# Patient Record
Sex: FEMALE | Race: WHITE | Employment: FULL TIME | URBAN - METROPOLITAN AREA
[De-identification: names, ages, dates, MRNs, and addresses within clinical notes are randomized per-mention and may not be internally consistent; named-entity substitution may affect disease eponyms.]

---

## 2020-04-13 ENCOUNTER — INITIAL PRENATAL (OUTPATIENT)
Dept: OBGYN CLINIC | Age: 30
End: 2020-04-13
Payer: COMMERCIAL

## 2020-04-13 VITALS
SYSTOLIC BLOOD PRESSURE: 103 MMHG | BODY MASS INDEX: 33.75 KG/M2 | TEMPERATURE: 98.6 F | WEIGHT: 183.38 LBS | DIASTOLIC BLOOD PRESSURE: 74 MMHG | HEIGHT: 62 IN | HEART RATE: 96 BPM

## 2020-04-13 PROBLEM — Z3A.24 24 WEEKS GESTATION OF PREGNANCY: Status: ACTIVE | Noted: 2020-04-13

## 2020-04-13 PROCEDURE — 76811 OB US DETAILED SNGL FETUS: CPT | Performed by: OBSTETRICS & GYNECOLOGY

## 2020-04-13 PROCEDURE — 99203 OFFICE O/P NEW LOW 30 MIN: CPT | Performed by: OBSTETRICS & GYNECOLOGY

## 2020-04-13 NOTE — PROGRESS NOTES
DOS: 20     [facility]ERNAL FETAL [facility]ICINE       MATERNAL-FETAL MEDICINE CONSULT    Referring/Requesting Provider: Thanh Arenas MD       CHIEF COMPLAINT: history of first trimester alcohol exposure    HISTORY OF PRESENT ILLNESS:   Isabelle Vazquez is a 26 y.o. female  at 25w3d who presents for ultrasound and consultation regarding her history of first trimester alcohol exposure and personal history of hip dysplasia. She reports drinking alcohol on 3 occasions prior to finding out about pregnancy at 5 weeks. She denies complaints and is overall feeling well. OB HISTORY:  OB History    Para Term  AB Living   2 1 1         SAB TAB Ectopic Molar Multiple Live Births                    # Outcome Date GA Lbr Jan/2nd Weight Sex Delivery Anes PTL Lv   2 Current            1 Term 2010    F Vag-Spont          PAST MEDICAL HISTORY:  Past Medical History:   Diagnosis Date    Abnormal Pap smear of cervix            PAST SURGICAL HISTORY:  History reviewed. No pertinent surgical history. PERTINENT FAMILY HISTORY:  No family history on file. MEDS:   Current Outpatient Rx   Medication Sig Dispense Refill    Prenatal Vit-Fe Fumarate-FA (PRENATAL VITAMIN PO) Take 1 tablet by mouth daily         ALLERGY:   No Known Allergies    REVIEW OF SYSTEMS:     Review of Systems   All other systems reviewed and are negative. PHYSICAL EXAM:  VITAL SIGNS: /74   Pulse 96   Temp 98.6 °F (37 °C) (Oral)   Ht 5' 2\" (1.575 m)   Wt 183 lb 6 oz (83.2 kg)   BMI 33.54 kg/m²     Physical Exam  Constitutional:       Appearance: Normal appearance. Neurological:      Mental Status: She is alert. Vitals signs and nursing note reviewed. IMAGING:  Please see ultrasound report for full details. IMPRESSION:   Isabelle Vazquez is a 26 y.o. female  with history of first trimester alcohol use and personal history of hip dysplasia.      1.  Alcohol:  The patient was counseled that there is no

## 2020-04-13 NOTE — LETTER
[facility] MATERNAL FETAL MEDICINE  [address] [address]   Dept: [phone]  Loc: [phone]  Jarod Chan MD                                           [facility] Consultation Letter     20     Zay Hebert MD     Re: Patient: Km Ford  YOB: 1990    MR Number: [id number] Date of Visit: 2020     Dear Dr. Viola Pruett had the pleasure of seeing your patient, Km Ford, in consultation in the [facility] Fetal Medicine Department of [facility]). DOS: 20     [facility] MATERNAL FETAL MEDICINE       MATERNAL-FETAL MEDICINE CONSULT    Referring/Requesting Provider: Zay Hebert MD       CHIEF COMPLAINT:  history of first trimester alcohol exposure    HISTORY OF PRESENT ILLNESS:   Lisbet Jones is a 26 y.o. female  at 25w3d who presents for ultrasound and consultation regarding her history of first trimester alcohol exposure and personal history of hip dysplasia. She reports drinking alcohol on 3 occasions prior to finding out about pregnancy at 5 weeks. She denies complaints and is overall feeling well. OB HISTORY:  OB History    Para Term  AB Living   2 1 1         SAB TAB Ectopic Molar Multiple Live Births                    # Outcome Date GA Lbr Jan/2nd Weight Sex Delivery Anes PTL Lv   2 Current            1 Term 2010    F Vag-Spont          PAST MEDICAL HISTORY:  Past Medical History:   Diagnosis Date    Abnormal Pap smear of cervix            PAST SURGICAL HISTORY:  History reviewed. No pertinent surgical history. PERTINENT FAMILY HISTORY:  No family history on file. MEDS:   Current Outpatient Rx   Medication Sig Dispense Refill    Prenatal Vit-Fe Fumarate-FA (PRENATAL VITAMIN PO) Take 1 tablet by mouth daily         ALLERGY:   No Known Allergies    REVIEW OF SYSTEMS:     Review of Systems   All other systems reviewed and are negative.        PHYSICAL EXAM:

## 2020-07-29 ENCOUNTER — HOSPITAL ENCOUNTER (INPATIENT)
Age: 30
LOS: 3 days | Discharge: HOME | DRG: 560 | End: 2020-08-01
Attending: LEGAL MEDICINE | Admitting: LEGAL MEDICINE
Payer: COMMERCIAL

## 2020-07-29 LAB
AMNISURE, POC: POSITIVE
Lab: ABNORMAL
NEGATIVE QC PASS/FAIL: ABNORMAL
POSITIVE QC PASS/FAIL: ABNORMAL

## 2020-07-29 PROCEDURE — 1220000001 HC SEMI PRIVATE L&D R&B

## 2020-07-29 RX ORDER — SODIUM CHLORIDE 0.9 % (FLUSH) 0.9 %
10 SYRINGE (ML) INJECTION PRN
Status: DISCONTINUED | OUTPATIENT
Start: 2020-07-29 | End: 2020-07-30

## 2020-07-29 RX ORDER — SODIUM CHLORIDE 0.9 % (FLUSH) 0.9 %
10 SYRINGE (ML) INJECTION EVERY 12 HOURS SCHEDULED
Status: DISCONTINUED | OUTPATIENT
Start: 2020-07-30 | End: 2020-07-30

## 2020-07-30 ENCOUNTER — ANESTHESIA (OUTPATIENT)
Dept: LABOR AND DELIVERY | Age: 30
DRG: 560 | End: 2020-07-30
Payer: COMMERCIAL

## 2020-07-30 ENCOUNTER — ANESTHESIA EVENT (OUTPATIENT)
Dept: LABOR AND DELIVERY | Age: 30
DRG: 560 | End: 2020-07-30
Payer: COMMERCIAL

## 2020-07-30 PROBLEM — Z37.9 NORMAL LABOR: Status: ACTIVE | Noted: 2020-07-30

## 2020-07-30 LAB
ABO/RH: NORMAL
ALBUMIN SERPL-MCNC: 3.2 G/DL (ref 3.5–5.2)
ALP BLD-CCNC: 174 U/L (ref 35–104)
ALT SERPL-CCNC: 14 U/L (ref 0–32)
AMPHETAMINE SCREEN, URINE: NOT DETECTED
ANION GAP SERPL CALCULATED.3IONS-SCNC: 17 MMOL/L (ref 7–16)
ANTIBODY SCREEN: NORMAL
AST SERPL-CCNC: 34 U/L (ref 0–31)
BARBITURATE SCREEN URINE: NOT DETECTED
BENZODIAZEPINE SCREEN, URINE: NOT DETECTED
BILIRUB SERPL-MCNC: 0.3 MG/DL (ref 0–1.2)
BUN BLDV-MCNC: 8 MG/DL (ref 6–20)
BUPRENORPHINE URINE: NOT DETECTED
CALCIUM SERPL-MCNC: 10.1 MG/DL (ref 8.6–10.2)
CANNABINOID SCREEN URINE: NOT DETECTED
CHLORIDE BLD-SCNC: 99 MMOL/L (ref 98–107)
CO2: 19 MMOL/L (ref 22–29)
COCAINE METABOLITE SCREEN URINE: NOT DETECTED
CREAT SERPL-MCNC: 0.6 MG/DL (ref 0.5–1)
GFR AFRICAN AMERICAN: >60
GFR NON-AFRICAN AMERICAN: >60 ML/MIN/1.73
GLUCOSE BLD-MCNC: 77 MG/DL (ref 74–99)
HCT VFR BLD CALC: 33.5 % (ref 34–48)
HEMOGLOBIN: 10.6 G/DL (ref 11.5–15.5)
Lab: NORMAL
MCH RBC QN AUTO: 28.2 PG (ref 26–35)
MCHC RBC AUTO-ENTMCNC: 31.6 % (ref 32–34.5)
MCV RBC AUTO: 89.1 FL (ref 80–99.9)
METHADONE SCREEN, URINE: NOT DETECTED
OPIATE SCREEN URINE: NOT DETECTED
OXYCODONE URINE: NOT DETECTED
PDW BLD-RTO: 15.6 FL (ref 11.5–15)
PHENCYCLIDINE SCREEN URINE: NOT DETECTED
PLATELET # BLD: 247 E9/L (ref 130–450)
PMV BLD AUTO: 10.7 FL (ref 7–12)
POTASSIUM SERPL-SCNC: 4.7 MMOL/L (ref 3.5–5)
RBC # BLD: 3.76 E12/L (ref 3.5–5.5)
SODIUM BLD-SCNC: 135 MMOL/L (ref 132–146)
TOTAL PROTEIN: 7 G/DL (ref 6.4–8.3)
WBC # BLD: 12.4 E9/L (ref 4.5–11.5)

## 2020-07-30 PROCEDURE — 86850 RBC ANTIBODY SCREEN: CPT

## 2020-07-30 PROCEDURE — 84112 EVAL AMNIOTIC FLUID PROTEIN: CPT

## 2020-07-30 PROCEDURE — 80307 DRUG TEST PRSMV CHEM ANLYZR: CPT

## 2020-07-30 PROCEDURE — 88307 TISSUE EXAM BY PATHOLOGIST: CPT

## 2020-07-30 PROCEDURE — 2580000003 HC RX 258: Performed by: LEGAL MEDICINE

## 2020-07-30 PROCEDURE — 85027 COMPLETE CBC AUTOMATED: CPT

## 2020-07-30 PROCEDURE — 1220000001 HC SEMI PRIVATE L&D R&B

## 2020-07-30 PROCEDURE — 6360000002 HC RX W HCPCS: Performed by: LEGAL MEDICINE

## 2020-07-30 PROCEDURE — 86901 BLOOD TYPING SEROLOGIC RH(D): CPT

## 2020-07-30 PROCEDURE — 6370000000 HC RX 637 (ALT 250 FOR IP): Performed by: LEGAL MEDICINE

## 2020-07-30 PROCEDURE — 7200000001 HC VAGINAL DELIVERY

## 2020-07-30 PROCEDURE — 36415 COLL VENOUS BLD VENIPUNCTURE: CPT

## 2020-07-30 PROCEDURE — 86900 BLOOD TYPING SEROLOGIC ABO: CPT

## 2020-07-30 PROCEDURE — 80053 COMPREHEN METABOLIC PANEL: CPT

## 2020-07-30 RX ORDER — SODIUM CHLORIDE 0.9 % (FLUSH) 0.9 %
10 SYRINGE (ML) INJECTION EVERY 12 HOURS SCHEDULED
Status: DISCONTINUED | OUTPATIENT
Start: 2020-07-30 | End: 2020-08-01 | Stop reason: HOSPADM

## 2020-07-30 RX ORDER — LIDOCAINE HYDROCHLORIDE 10 MG/ML
30 INJECTION, SOLUTION EPIDURAL; INFILTRATION; INTRACAUDAL; PERINEURAL PRN
Status: DISCONTINUED | OUTPATIENT
Start: 2020-07-30 | End: 2020-07-30

## 2020-07-30 RX ORDER — MODIFIED LANOLIN
OINTMENT (GRAM) TOPICAL PRN
Status: DISCONTINUED | OUTPATIENT
Start: 2020-07-30 | End: 2020-08-01 | Stop reason: HOSPADM

## 2020-07-30 RX ORDER — SODIUM CHLORIDE 0.9 % (FLUSH) 0.9 %
10 SYRINGE (ML) INJECTION PRN
Status: DISCONTINUED | OUTPATIENT
Start: 2020-07-30 | End: 2020-08-01 | Stop reason: HOSPADM

## 2020-07-30 RX ORDER — METHYLERGONOVINE MALEATE 0.2 MG/ML
200 INJECTION INTRAVENOUS
Status: ACTIVE | OUTPATIENT
Start: 2020-07-30 | End: 2020-07-30

## 2020-07-30 RX ORDER — IBUPROFEN 600 MG/1
600 TABLET ORAL EVERY 6 HOURS
Status: DISCONTINUED | OUTPATIENT
Start: 2020-07-30 | End: 2020-08-01 | Stop reason: HOSPADM

## 2020-07-30 RX ORDER — SODIUM CHLORIDE, SODIUM LACTATE, POTASSIUM CHLORIDE, CALCIUM CHLORIDE 600; 310; 30; 20 MG/100ML; MG/100ML; MG/100ML; MG/100ML
INJECTION, SOLUTION INTRAVENOUS CONTINUOUS
Status: DISCONTINUED | OUTPATIENT
Start: 2020-07-30 | End: 2020-08-01 | Stop reason: HOSPADM

## 2020-07-30 RX ORDER — EPHEDRINE SULFATE/0.9% NACL/PF 50 MG/5 ML
10 SYRINGE (ML) INTRAVENOUS EVERY 5 MIN PRN
Status: DISCONTINUED | OUTPATIENT
Start: 2020-07-30 | End: 2020-07-30

## 2020-07-30 RX ORDER — SODIUM CHLORIDE, SODIUM LACTATE, POTASSIUM CHLORIDE, CALCIUM CHLORIDE 600; 310; 30; 20 MG/100ML; MG/100ML; MG/100ML; MG/100ML
500 INJECTION, SOLUTION INTRAVENOUS
Status: DISCONTINUED | OUTPATIENT
Start: 2020-07-30 | End: 2020-07-30

## 2020-07-30 RX ORDER — MORPHINE SULFATE 2 MG/ML
2 INJECTION, SOLUTION INTRAMUSCULAR; INTRAVENOUS EVERY 4 HOURS PRN
Status: DISCONTINUED | OUTPATIENT
Start: 2020-07-30 | End: 2020-07-30

## 2020-07-30 RX ORDER — DOCUSATE SODIUM 100 MG/1
100 CAPSULE, LIQUID FILLED ORAL 2 TIMES DAILY
Status: DISCONTINUED | OUTPATIENT
Start: 2020-07-30 | End: 2020-08-01 | Stop reason: HOSPADM

## 2020-07-30 RX ORDER — EPHEDRINE SULFATE/0.9% NACL/PF 50 MG/5 ML
10 SYRINGE (ML) INTRAVENOUS EVERY 5 MIN PRN
Status: DISCONTINUED | OUTPATIENT
Start: 2020-07-30 | End: 2020-07-30 | Stop reason: CLARIF

## 2020-07-30 RX ORDER — OXYCODONE HYDROCHLORIDE 5 MG/1
5 TABLET ORAL EVERY 4 HOURS PRN
Status: DISCONTINUED | OUTPATIENT
Start: 2020-07-30 | End: 2020-08-01 | Stop reason: HOSPADM

## 2020-07-30 RX ORDER — ACETAMINOPHEN 500 MG
1000 TABLET ORAL EVERY 6 HOURS PRN
Status: DISCONTINUED | OUTPATIENT
Start: 2020-07-30 | End: 2020-08-01 | Stop reason: HOSPADM

## 2020-07-30 RX ORDER — CARBOPROST TROMETHAMINE 250 UG/ML
250 INJECTION, SOLUTION INTRAMUSCULAR
Status: ACTIVE | OUTPATIENT
Start: 2020-07-30 | End: 2020-07-30

## 2020-07-30 RX ORDER — FERROUS SULFATE 325(65) MG
325 TABLET ORAL
Status: DISCONTINUED | OUTPATIENT
Start: 2020-07-30 | End: 2020-08-01 | Stop reason: HOSPADM

## 2020-07-30 RX ORDER — DEXTROSE, SODIUM CHLORIDE, SODIUM LACTATE, POTASSIUM CHLORIDE, AND CALCIUM CHLORIDE 5; .6; .31; .03; .02 G/100ML; G/100ML; G/100ML; G/100ML; G/100ML
INJECTION, SOLUTION INTRAVENOUS CONTINUOUS
Status: DISCONTINUED | OUTPATIENT
Start: 2020-07-30 | End: 2020-07-30

## 2020-07-30 RX ORDER — SODIUM CHLORIDE, SODIUM LACTATE, POTASSIUM CHLORIDE, CALCIUM CHLORIDE 600; 310; 30; 20 MG/100ML; MG/100ML; MG/100ML; MG/100ML
INJECTION, SOLUTION INTRAVENOUS CONTINUOUS
Status: DISCONTINUED | OUTPATIENT
Start: 2020-07-30 | End: 2020-07-30

## 2020-07-30 RX ORDER — ONDANSETRON 2 MG/ML
4 INJECTION INTRAMUSCULAR; INTRAVENOUS EVERY 6 HOURS PRN
Status: DISCONTINUED | OUTPATIENT
Start: 2020-07-30 | End: 2020-07-30

## 2020-07-30 RX ORDER — SODIUM CHLORIDE, SODIUM LACTATE, POTASSIUM CHLORIDE, CALCIUM CHLORIDE 600; 310; 30; 20 MG/100ML; MG/100ML; MG/100ML; MG/100ML
500 INJECTION, SOLUTION INTRAVENOUS CONTINUOUS
Status: DISCONTINUED | OUTPATIENT
Start: 2020-07-30 | End: 2020-07-30

## 2020-07-30 RX ADMIN — DOCUSATE SODIUM 100 MG: 100 CAPSULE, LIQUID FILLED ORAL at 19:16

## 2020-07-30 RX ADMIN — WITCH HAZEL 40 EACH: 500 SOLUTION RECTAL; TOPICAL at 16:12

## 2020-07-30 RX ADMIN — MORPHINE SULFATE 2 MG: 2 INJECTION, SOLUTION INTRAMUSCULAR; INTRAVENOUS at 11:35

## 2020-07-30 RX ADMIN — SODIUM CHLORIDE, POTASSIUM CHLORIDE, SODIUM LACTATE AND CALCIUM CHLORIDE: 600; 310; 30; 20 INJECTION, SOLUTION INTRAVENOUS at 00:26

## 2020-07-30 RX ADMIN — BENZOCAINE AND LEVOMENTHOL: 200; 5 SPRAY TOPICAL at 16:12

## 2020-07-30 RX ADMIN — Medication 1 MILLI-UNITS/MIN: at 02:39

## 2020-07-30 RX ADMIN — SODIUM CHLORIDE, PRESERVATIVE FREE 10 ML: 5 INJECTION INTRAVENOUS at 15:10

## 2020-07-30 RX ADMIN — IBUPROFEN 600 MG: 600 TABLET, FILM COATED ORAL at 19:16

## 2020-07-30 RX ADMIN — Medication: at 16:12

## 2020-07-30 ASSESSMENT — PAIN SCALES - GENERAL
PAINLEVEL_OUTOF10: 10
PAINLEVEL_OUTOF10: 3
PAINLEVEL_OUTOF10: 0

## 2020-07-30 ASSESSMENT — PAIN DESCRIPTION - DESCRIPTORS
DESCRIPTORS: DISCOMFORT;SHARP;SQUEEZING
DESCRIPTORS: PRESSURE;SQUEEZING;TIGHTNESS

## 2020-07-30 NOTE — PROGRESS NOTES
Dr. Dangelo Naik called in for update on patient. Reported EFM reading. Orders received to start Pitocin at 1 then increase by 1 every hour.

## 2020-07-30 NOTE — PROGRESS NOTES
Patient arrived ambulatory on OB unit with c/o LOF. Patient is due 20 and is 39 weeks. She is a  2 para 1. Patient taken to labor room 11. Room orientation provided. Patient denies any vaginal bleeding at this time and reports positive fetal movement.

## 2020-07-30 NOTE — H&P
[address] [place]                              HISTORY AND PHYSICAL    PATIENT NAME: Dee Jones                     :        1990  MED REC NO:   [id number]                            ROOM:       LD11  ACCOUNT NO:   [de-identified]                           ADMIT DATE: 2020. PROVIDER:     Adi Meza MD    HISTORY OF PRESENT ILLNESS:  The patient is a 67-year-old white female   2, para 1, presented with spontaneous rupture of membranes on  2020. No vaginal bleeding. Irregular contractions. Group B  strep negative. No change in her bowel or urinary habits. No fevers or  chills. No viral prodrome. For her past medical, surgical, GYN, social, family history, please  refer to ACOG forms A and B. REVIEW OF SYSTEMS:  Negative for cardiac, respiratory, GI, ,  neurologic, psychiatric, ENT, integument, hematologic, lymphatic,  constitutional abnormalities. PHYSICAL EXAMINATION:  VITAL SIGNS:  Stable. Blood pressure is 112/75, heart rate 81,  temperature 97.9, respiratory rate 18. ABDOMEN:  Gravid, soft, nontender. Fetal heart tones are present in the  140s with nsdz-ea-ebvo variability present. Accelerations noted. Irregular contractions. Cervix is 3/50, vertex -2 with clear fluid  issuing. Estimated fetal weight is 3000 gm. EXTREMITIES:  No clubbing, cyanosis. 2+ edema. NEURO:  CN II through XII are grossly intact. She is alert and oriented  x4. ASSESSMENT:  Intrauterine pregnancy at 39-1/2 weeks in labor. PLAN:  Risks of vaginal delivery and operative delivery have been  reviewed with her. Indications for operative delivery have been  reviewed with her. Shoulder dystocia and birth trauma have been  reviewed with her. All her questions have been answered and she wishes  to proceed with attempt at vaginal delivery.         Bello Salmon MD    D: 2020 8:03:57 T: 07/30/2020 8:11:45     [email or web address]  Job#: [id number]     Doc#: [id number]

## 2020-07-30 NOTE — PROGRESS NOTES
Very restless. States pain not much better after IV Morphine dispensed. Requesting epidural for relief of labor pain now.  IV bolus initiated

## 2020-07-30 NOTE — PROGRESS NOTES
Strong urge to push with contractions now with much needed support provided. Instructed on slow deep breathing when possible with attempt made by patient but states pressure strong now. Hema Hernandez notified in office to attend delivery and states she is on her way.

## 2020-07-30 NOTE — PROGRESS NOTES
Bedside commode obtained at this time from central supply. Explained to patient to use while in labor so that she does not have to be unhooked from the monitor.

## 2020-07-30 NOTE — PROGRESS NOTES
Becoming tearful with contractions. Breathing well through contractions with support given to patient. No request for analgesia at this time. Instructed on slow,deep breathing with contractions.

## 2020-07-30 NOTE — PLAN OF CARE
Problem: Anxiety:  Goal: Level of anxiety will decrease  Description: Level of anxiety will decrease  2020 by Milton Whitaker RN  Outcome: Completed     Problem: Breathing Pattern - Ineffective:  Goal: Able to breathe comfortably  Description: Able to breathe comfortably  2020 by Milton Whitaker RN  Outcome: Completed     Problem: Fluid Volume - Imbalance:  Goal: Absence of imbalanced fluid volume signs and symptoms  Description: Absence of imbalanced fluid volume signs and symptoms  2020 by Milton Whitaker RN  Outcome: Completed     Problem: Fluid Volume - Imbalance:  Goal: Absence of intrapartum hemorrhage signs and symptoms  Description: Absence of intrapartum hemorrhage signs and symptoms  Outcome: Completed     Problem: Infection - Intrapartum Infection:  Goal: Will show no infection signs and symptoms  Description: Will show no infection signs and symptoms  Outcome: Completed     Problem: Labor Process - Prolonged:  Goal: Labor progression, first stage, within specified pattern  Description: Labor progression, first stage, within specified pattern  Outcome: Completed     Problem: Labor Process - Prolonged:  Goal: Labor progession, second stage, within specified pattern  Description: Labor progession, second stage, within specified pattern  Outcome: Completed     Problem: Labor Process - Prolonged:  Goal: Uterine contractions within specified parameters  Description: Uterine contractions within specified parameters  Outcome: Completed     Problem: Labor Process - Prolonged:  Goal: Uterine contractions within specified parameters  Description: Uterine contractions within specified parameters  Outcome: Completed     Problem:  Screening:  Goal: Ability to make informed decisions regarding treatment has improved  Description: Ability to make informed decisions regarding treatment has improved  Outcome: Completed     Problem: Pain - Acute:  Goal: Pain level will decrease  Description: Pain level will decrease  Outcome: Completed     Problem: Pain - Acute:  Goal: Able to cope with pain  Description: Able to cope with pain  Outcome: Completed     Problem: Tissue Perfusion - Uteroplacental, Altered:  Goal: Absence of abnormal fetal heart rate pattern  Description: Absence of abnormal fetal heart rate pattern  Outcome: Completed     Problem: Urinary Retention:  Goal: Experiences of bladder distention will decrease  Description: Experiences of bladder distention will decrease  Outcome: Completed     Problem: Urinary Retention:  Goal: Urinary elimination within specified parameters  Description: Urinary elimination within specified parameters  Outcome: Completed     Problem: Pain:  Goal: Pain level will decrease  Description: Pain level will decrease  Outcome: Completed     Problem: Pain:  Goal: Control of acute pain  Description: Control of acute pain  Outcome: Completed     Problem: Pain:  Goal: Control of chronic pain  Description: Control of chronic pain  Outcome: Completed

## 2020-07-30 NOTE — ANESTHESIA PRE PROCEDURE
Department of Anesthesiology  Preprocedure Note       Name:  Deanna Nix   Age:  26 y.o.  :  1990                                          MRN:  [id number]         Date:  2020      Surgeon: * No surgeons listed *    Procedure: * No procedures listed *    Medications prior to admission:   Prior to Admission medications    Medication Sig Start Date End Date Taking?  Authorizing Provider   Prenatal Vit-Fe Fumarate-FA (PRENATAL VITAMIN PO) Take 1 tablet by mouth daily   Yes Historical Provider, MD       Current medications:    Current Facility-Administered Medications   Medication Dose Route Frequency Provider Last Rate Last Dose    dextrose 5 % in lactated ringers infusion   Intravenous Continuous Milly Blanco MD        lactated ringers infusion   Intravenous Continuous Milly Blanco  mL/hr at 20 1145      lidocaine PF 1 % injection 30 mL  30 mL Other PRN Milly Blanco MD        oxytocin (PITOCIN) 30 units in 500 mL infusion  1 katya-units/min Intravenous Continuous Milly Blanco MD 10 mL/hr at 20 1139 10 katya-units/min at 20 1139    ondansetron (ZOFRAN) injection 4 mg  4 mg Intravenous Q6H PRN Milly Blanco MD        oxytocin (PITOCIN) 30 units in 500 mL infusion  1 katya-units/min Intravenous Continuous PRN Milly Blanco MD        morphine (PF) injection 2 mg  2 mg Intravenous Q4H PRN Milly Blanco MD   2 mg at 20 1135    fentaNYL 1.85mcg/ml and Bupivicaine 0.1% in 0.9% NS 135ml infusion (OB) epidural  15 mL/hr Epidural Continuous Krissy Mccallum MD        fentaNYL 1.85mcg/ml and bupivacaine 0.1% 15ml syringe (Home Care) (OB) 15 mL epidural  15 mL Epidural Once Krissy Mccallum MD        lactated ringers infusion 500 mL  500 mL Intravenous Once PRN Krissy Mccallum MD        lactated ringers infusion 500 mL  500 mL Intravenous Once PRN Krissy Mccallum MD        ePHEDrine injection 10 mg  10 mg Intravenous Q5 Min PRN Harley Thorpe Sirisha Santana MD        lactated ringers infusion 500 mL  500 mL Intravenous Continuous Florinda Varma MD        sodium chloride flush 0.9 % injection 10 mL  10 mL Intravenous 2 times per day Rae Oliver MD        sodium chloride flush 0.9 % injection 10 mL  10 mL Intravenous PRN Rae Oliver MD        hydrocortisone 2.5 % cream   Topical Q2H PRN Rae Oliver MD           Allergies:  No Known Allergies    Problem List:    Patient Active Problem List   Diagnosis Code    24 weeks gestation of pregnancy Z3A.24    Normal labor and delivery O80       Past Medical History:        Diagnosis Date    Abnormal Pap smear of cervix     2017       Past Surgical History:  No past surgical history on file. Social History:    Social History     Tobacco Use    Smoking status: Never Smoker   Substance Use Topics    Alcohol use: Not Currently     Comment: drank alcohol during first 5 weeks prior to knowing                                Counseling given: Not Answered      Vital Signs (Current):   Vitals:    07/30/20 1000 07/30/20 1031 07/30/20 1104 07/30/20 1131   BP: 118/72 111/67  117/76   Pulse: 90 71  68   Resp:  20 18    Temp:       TempSrc:       Weight:       Height:                                                  BP Readings from Last 3 Encounters:   07/30/20 117/76   04/13/20 103/74       NPO Status:                                                                                 BMI:   Wt Readings from Last 3 Encounters:   07/30/20 203 lb (92.1 kg)   04/13/20 183 lb 6 oz (83.2 kg)     Body mass index is 38.36 kg/m².     CBC:   Lab Results   Component Value Date    WBC 12.4 07/30/2020    RBC 3.76 07/30/2020    HGB 10.6 07/30/2020    HCT 33.5 07/30/2020    MCV 89.1 07/30/2020    RDW 15.6 07/30/2020     07/30/2020       CMP:   Lab Results   Component Value Date     07/30/2020    K 4.7 07/30/2020    CL 99 07/30/2020    CO2 19 07/30/2020    BUN 8 07/30/2020    CREATININE 0.6 07/30/2020    GFRAA >60 07/30/2020    LABGLOM >60 07/30/2020    GLUCOSE 77 07/30/2020    PROT 7.0 07/30/2020    CALCIUM 10.1 07/30/2020    BILITOT 0.3 07/30/2020    ALKPHOS 174 07/30/2020    AST 34 07/30/2020    ALT 14 07/30/2020       POC Tests: No results for input(s): POCGLU, POCNA, POCK, POCCL, POCBUN, POCHEMO, POCHCT in the last 72 hours. Coags: No results found for: PROTIME, INR, APTT    HCG (If Applicable): No results found for: PREGTESTUR, PREGSERUM, HCG, HCGQUANT     ABGs: No results found for: PHART, PO2ART, BKP4RRG, KUE4LZI, BEART, C9CCQGNH     Type & Screen (If Applicable):  No results found for: LABABO, LABRH    Drug/Infectious Status (If Applicable):  No results found for: HIV, HEPCAB    COVID-19 Screening (If Applicable): No results found for: COVID19      Anesthesia Evaluation    Airway: Mallampati: II  TM distance: >3 FB   Neck ROM: full  Mouth opening: > = 3 FB Dental:          Pulmonary:Negative Pulmonary ROS and normal exam                               Cardiovascular:Negative CV ROS            Rhythm: regular  Rate: normal                    Neuro/Psych:   Negative Neuro/Psych ROS              GI/Hepatic/Renal: Neg GI/Hepatic/Renal ROS            Endo/Other: Negative Endo/Other ROS                    Abdominal:           Vascular: negative vascular ROS. Anesthesia Plan      epidural     ASA 2             Anesthetic plan and risks discussed with patient.         Attending anesthesiologist reviewed and agrees with Pre Eval content              BERNADETTE Saez CRNA   7/30/2020

## 2020-07-30 NOTE — PROGRESS NOTES
Patient placed on EFM. Audible heart tones heard by RN. Amnisure and cervical exam completed. Amnisure positive for ROM. Vital signs obtained, assessment completed. Patient denies any pain at this time. Consents explained and signed by patient. Patient verbalizes understanding and has no questions.

## 2020-07-30 NOTE — PROGRESS NOTES
Spontaneous vaginal delivery for  viable female by Sherley Smyth at 1235pm. Baby alert and active at time of delivery with strong cry observed. Baby taken to warmer for evaluation by Nursery RN.  Apgars 9/9 Weight [id number]

## 2020-07-30 NOTE — PROGRESS NOTES
Baby skin to skin with mother at mothers request. Baby alert and active. Mother instructed on importance of baby's positioning and to maintain  non-obstructed airway in baby at all times when skin to skin- mother verbalized understanding.

## 2020-07-30 NOTE — OP NOTE
[address]                                OPERATIVE REPORT    PATIENT NAME: Harjinder Jones                     :        1990  MED REC NO:   [id number]                            ROOM:       LD11  ACCOUNT NO:   [de-identified]                           ADMIT DATE: 2020. PROVIDER:     Jaiden Gonzales MD    DATE OF PROCEDURE:  2020    SURGEON:  Jaiden Gonzales MD    PROCEDURE IN DETAIL:  The patient felt the urge to push. She was placed  in [place] position. She was able to deliver the fetal head. Fetal  trunk was delivered with minimal traction applied in an axis parallel to  the fetal spine after the shoulder had cleared the pubic bone. Nares  and hypopharynx were suctioned. Cord was clamped and cut. Infant was  crying and vigorous. Cord gases and blood samples were obtained. Placenta was removed spontaneously. Note was made of an intact two  artery, one vein cord placenta which was sent to pathology. No cervical  or vaginal lacerations were noted. Fundus was firm. Estimated blood  loss was 400 mL. Bedside exam of the infant revealed no gross  abnormalities. The cord arterial gas pH is 7.350, base excess -2.1. Cord venous gas pH 7.371, base excess -0.9. Pediatrician is Saurabh Norton. Mother and infant went to recovery room in stable condition.         Alec Cardoso MD    D: 2020 13:07:26       T: 2020 13:16:19     [email or web address]  Job#: [id number]     Doc#: [id number]    CC:

## 2020-07-30 NOTE — PROGRESS NOTES
Updated Dr. Alexandrea Galloway on pt arrival, and ROM. Reported cervical exam, assessment, and EFM readings. Orders received to admit patient.

## 2020-07-30 NOTE — PROGRESS NOTES
RN assuming care of patient. IV Pitocin augmentation of labor in progress with RN assessing FHR and contractions [id number] while Pitocin infusing in first stage of labor. VSS. Afebrile. Reactive FHR tracing observed with audible fetal activity noted. Contractions mild /irregular and patient breathing well through contractions unassisted. Denies urge to void. IVF's LR infusing well with Pitocin. Support person attentive at bedside,. Ice chips given. Call bell in reach.

## 2020-07-30 NOTE — PROGRESS NOTES
Ez Murphy in at bedside to see patient and discuss plan of care-patient agreeable. Vaginal exam done at this time. Scant bloody show observed with exam. Jose Cruz care given with chux changed and clean jose cruz pad given. Patient and Ez Murphy  discussed options for pain control during labor and patient refusing epidural at this time. Orders for IV analgesia received.

## 2020-07-30 NOTE — PROGRESS NOTES
IV Morphine given for c/o painful contractions at patients request. Becoming more uncomfortable and restless. Support given.

## 2020-07-30 NOTE — PROGRESS NOTES
Skin to skin ended. Baby wrapped and held by family member. Lunch ordered by patient. Doing well with no c/o's at this time. VSS.

## 2020-07-30 NOTE — PLAN OF CARE
Problem: Anxiety:  Goal: Level of anxiety will decrease  Description: Level of anxiety will decrease  Outcome: Met This Shift     Problem: Breathing Pattern - Ineffective:  Goal: Able to breathe comfortably  Description: Able to breathe comfortably  Outcome: Met This Shift     Problem: Fluid Volume - Imbalance:  Goal: Absence of imbalanced fluid volume signs and symptoms  Description: Absence of imbalanced fluid volume signs and symptoms  Outcome: Met This Shift

## 2020-07-31 PROBLEM — Z37.9 NORMAL LABOR: Status: ACTIVE | Noted: 2020-07-31

## 2020-07-31 LAB — HEMOGLOBIN: 9 G/DL (ref 11.5–15.5)

## 2020-07-31 PROCEDURE — 36415 COLL VENOUS BLD VENIPUNCTURE: CPT

## 2020-07-31 PROCEDURE — 6370000000 HC RX 637 (ALT 250 FOR IP): Performed by: LEGAL MEDICINE

## 2020-07-31 PROCEDURE — 90471 IMMUNIZATION ADMIN: CPT | Performed by: LEGAL MEDICINE

## 2020-07-31 PROCEDURE — 85018 HEMOGLOBIN: CPT

## 2020-07-31 PROCEDURE — 6360000002 HC RX W HCPCS: Performed by: LEGAL MEDICINE

## 2020-07-31 PROCEDURE — 90715 TDAP VACCINE 7 YRS/> IM: CPT | Performed by: LEGAL MEDICINE

## 2020-07-31 PROCEDURE — 1220000001 HC SEMI PRIVATE L&D R&B

## 2020-07-31 RX ADMIN — FERROUS SULFATE TAB 325 MG (65 MG ELEMENTAL FE) 325 MG: 325 (65 FE) TAB at 08:37

## 2020-07-31 RX ADMIN — IBUPROFEN 600 MG: 600 TABLET, FILM COATED ORAL at 22:05

## 2020-07-31 RX ADMIN — IBUPROFEN 600 MG: 600 TABLET, FILM COATED ORAL at 16:15

## 2020-07-31 RX ADMIN — DOCUSATE SODIUM 100 MG: 100 CAPSULE, LIQUID FILLED ORAL at 08:46

## 2020-07-31 RX ADMIN — DOCUSATE SODIUM 100 MG: 100 CAPSULE, LIQUID FILLED ORAL at 20:27

## 2020-07-31 RX ADMIN — IBUPROFEN 600 MG: 600 TABLET, FILM COATED ORAL at 04:28

## 2020-07-31 RX ADMIN — FERROUS SULFATE TAB 325 MG (65 MG ELEMENTAL FE) 325 MG: 325 (65 FE) TAB at 16:14

## 2020-07-31 RX ADMIN — FERROUS SULFATE TAB 325 MG (65 MG ELEMENTAL FE) 325 MG: 325 (65 FE) TAB at 12:47

## 2020-07-31 RX ADMIN — IBUPROFEN 600 MG: 600 TABLET, FILM COATED ORAL at 10:18

## 2020-07-31 RX ADMIN — TETANUS TOXOID, REDUCED DIPHTHERIA TOXOID AND ACELLULAR PERTUSSIS VACCINE, ADSORBED 0.5 ML: 5; 2.5; 8; 8; 2.5 SUSPENSION INTRAMUSCULAR at 10:16

## 2020-07-31 ASSESSMENT — PAIN SCALES - GENERAL
PAINLEVEL_OUTOF10: 0
PAINLEVEL_OUTOF10: 1

## 2020-07-31 NOTE — PROGRESS NOTES
Hearing screening results were discussed with parent. Questions answered. Brochure given to parent. Advised to monitor developmental milestones and contact physician for any concerns.    Electronically signed by Sp Giordano on 7/31/2020 at 5:55 AM

## 2020-07-31 NOTE — DISCHARGE SUMMARY
[address]                               DISCHARGE SUMMARY    PATIENT NAME: Rodolfo Lopez                     :        1990  MED REC NO:   [id number]                            ROOM:       LD11  ACCOUNT NO:   [de-identified]                           ADMIT DATE: 2020. PROVIDER:     Renee Malhotra MD                  DISCHARGE DATE:    ADMISSION DIAGNOSES:  Intrauterine pregnancy, at term, with ruptured  membranes. DISCHARGE DIAGNOSES:  Intrauterine pregnancy, at term, with ruptured  membranes as well as patient in labor. PROCEDURE PERFORMED:  Vaginal delivery. HOSPITAL COURSE IN DETAIL:  The patient is doing well. Voiding well. Minimal lochia. Pain is under adequate control. Wishes to go home when  the infant is discharged. Admission labs revealed a white count of  12.4, hemoglobin 10.6, platelets 989,430. Postpartum day-1 hemoglobin  is 9. She is afebrile. Heart rate 83-75, blood pressure 105-106 over  53-68. Fundus is firm and two fingerbreadths below the umbilicus. Perineum dry and intact. ASSESSMENT:  The patient doing well, postpartum day-1, wishes to go home  when the infant is discharged. Stable. PLAN:  Home when the infant is discharged with fever, bleeding, emboli,  lifting, climbing, driving precautions. She is to follow up at the  office in 6 weeks. She indicates understanding of all instructions.         Melissa Rea MD    D: 2020 7:57:20       T: 2020 8:06:38     [email or web address]  Job#: [id number]     Doc#: [id number]    CC:

## 2020-08-01 VITALS
SYSTOLIC BLOOD PRESSURE: 118 MMHG | WEIGHT: 203 LBS | HEART RATE: 83 BPM | RESPIRATION RATE: 16 BRPM | BODY MASS INDEX: 38.33 KG/M2 | TEMPERATURE: 97.6 F | DIASTOLIC BLOOD PRESSURE: 80 MMHG | HEIGHT: 61 IN

## 2020-08-01 PROCEDURE — 6370000000 HC RX 637 (ALT 250 FOR IP): Performed by: LEGAL MEDICINE

## 2020-08-01 RX ADMIN — DOCUSATE SODIUM 100 MG: 100 CAPSULE, LIQUID FILLED ORAL at 10:11

## 2020-08-01 RX ADMIN — FERROUS SULFATE TAB 325 MG (65 MG ELEMENTAL FE) 325 MG: 325 (65 FE) TAB at 10:10

## 2020-08-01 RX ADMIN — IBUPROFEN 600 MG: 600 TABLET, FILM COATED ORAL at 06:59

## 2020-08-01 ASSESSMENT — PAIN SCALES - GENERAL: PAINLEVEL_OUTOF10: 6

## 2020-08-01 NOTE — PROGRESS NOTES
Assuming care of patient. Patient resting in bed. Plan of care discussed with patient; patient verbalizes understanding. Patient eager for discharge. ERIKA Valnezuela, nurse graduate, under the direction of this RN. Call light in reach.

## 2020-08-01 NOTE — LACTATION NOTE
Discussed breastfeeding with pt. Pt states baby is latching well on both sides. Encouraged feeds every 2-3 hours. Pt has folder and knows to call with any questions or concerns after discharge if needed.